# Patient Record
(demographics unavailable — no encounter records)

---

## 2025-06-03 NOTE — DISCUSSION/SUMMARY
[de-identified] : Nondisplaced tibial plateau fracture placed in hinged knee brace today she is been walking without an assistive device and the fracture is not displaced she will avoid impact type activities she will follow-up in 1 month with repeat radiographs ice Tylenol anti-inflammatory medication as needed for pain.  All questions answered Universal Safety Interventions

## 2025-06-03 NOTE — PHYSICAL EXAM
[de-identified] : General Exam   Well developed, well nourished No apparent distress Oriented to person, place, and time Mood: Normal Affect: Normal Balance and coordination: Normal Gait: Normal  left knee exam   Skin: Clean, dry, intact Inspection: No obvious malalignment, no masses, no swelling, no effusion. Tenderness: no MJLT. + LJLT. No tenderness over the medial and lateral patella facets. No ttp medial/lateral epicondyle, patella tendon, tibial tubercle, pes anserinus, or proximal fibula. ROM: 0 to 90 no pain with deep flexion Stability: Stable to varus, valgus, lachman testing. Negative anterior/posterior drawer. Additional tests: Negative McMurrays test, Negative patellar grind test. Strength: 5/5 Q/H/TA/GS/EHL, no atrophy Neuro: In tact to light touch throughout in dp/sp/tib/jairo/saph nerve districutions, DTR's normal Pulses: 2+ DP/PT pulses. [de-identified] : The following radiographs were ordered and read by me during this patients visit. I reviewed each radiograph in detail with the patient and discussed the findings as highlighted below.  AP lateral internal/external oblique views were obtained today there is a nondisplaced lateral tibial plateau fracture unchanged in prior radiographs